# Patient Record
Sex: FEMALE | Race: WHITE | NOT HISPANIC OR LATINO | Employment: UNEMPLOYED | URBAN - METROPOLITAN AREA
[De-identification: names, ages, dates, MRNs, and addresses within clinical notes are randomized per-mention and may not be internally consistent; named-entity substitution may affect disease eponyms.]

---
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
## 2025-02-28 ENCOUNTER — TELEPHONE (OUTPATIENT)
Dept: OBSTETRICS AND GYNECOLOGY | Facility: HOSPITAL | Age: 20
End: 2025-02-28

## 2025-02-28 NOTE — TELEPHONE ENCOUNTER
Called patient back to get more information. She states the pain was very sharp and stabbing and made it difficult to walk Wednesday and Thursday, did get a little better yesterday and today but states the pain flares up towards the end of the day or when she is up moving around. She denies bleeding. I spoke with Dr Dumont and she suggests patient go to ER to be evaluated further. Pt denies bleeding. I asked patient if she was planning on coming to our practice for the pregnancy if everything turns out okay and she said she has an appt Monday 3/3/25 with an office across from our office in [place]. I do not see an appt in her chart but she said she will go to ER and follow up with that provider she has an appt with Monday.   
no concerns